# Patient Record
Sex: FEMALE | Race: OTHER | Employment: FULL TIME | URBAN - METROPOLITAN AREA
[De-identification: names, ages, dates, MRNs, and addresses within clinical notes are randomized per-mention and may not be internally consistent; named-entity substitution may affect disease eponyms.]

---

## 2021-09-07 ENCOUNTER — HOSPITAL ENCOUNTER (OUTPATIENT)
Age: 61
Discharge: OTHER INSTITUTION | End: 2021-09-07
Payer: COMMERCIAL

## 2021-09-07 VITALS
SYSTOLIC BLOOD PRESSURE: 232 MMHG | OXYGEN SATURATION: 96 % | HEART RATE: 119 BPM | DIASTOLIC BLOOD PRESSURE: 117 MMHG | TEMPERATURE: 98 F | RESPIRATION RATE: 20 BRPM

## 2021-09-07 DIAGNOSIS — R29.898 RIGHT ARM WEAKNESS: Primary | ICD-10-CM

## 2021-09-07 DIAGNOSIS — I16.1 HYPERTENSIVE EMERGENCY: ICD-10-CM

## 2021-09-07 PROCEDURE — 99204 OFFICE O/P NEW MOD 45 MIN: CPT | Performed by: NURSE PRACTITIONER

## 2021-09-07 NOTE — ED PROVIDER NOTES
Patient Seen in: [facility]      History   Patient presents with:  Numbness Weakness    Stated Complaint: numbness/weakness rt arm and leg    HPI/Subjective:   HPI    This is a 34-year-old female who presents with right-sided numbness, weakne reactive to light. Right eye: Pupil is round and reactive. Cardiovascular:      Rate and Rhythm: Tachycardia present. Heart sounds: Normal heart sounds. Pulmonary:      Breath sounds: Normal breath sounds.    Musculoskeletal:      Cervical bibi

## 2021-09-07 NOTE — ED INITIAL ASSESSMENT (HPI)
Reports numbness and weakness to R side of body since Sunday. States \"I feel off balance. \" denies chest pain, shortness of breath, or headache. Has not seen a primary in years. Denies any medical hx. Heavy cigarette smoker. bp 232/117.